# Patient Record
Sex: MALE | Race: WHITE | URBAN - METROPOLITAN AREA
[De-identification: names, ages, dates, MRNs, and addresses within clinical notes are randomized per-mention and may not be internally consistent; named-entity substitution may affect disease eponyms.]

---

## 2022-12-29 ENCOUNTER — OFFICE VISIT (OUTPATIENT)
Dept: URGENT CARE | Age: 42
End: 2022-12-29

## 2022-12-29 VITALS
BODY MASS INDEX: 34.5 KG/M2 | RESPIRATION RATE: 16 BRPM | DIASTOLIC BLOOD PRESSURE: 76 MMHG | SYSTOLIC BLOOD PRESSURE: 118 MMHG | HEIGHT: 71 IN | WEIGHT: 246.4 LBS | HEART RATE: 92 BPM | TEMPERATURE: 99 F | OXYGEN SATURATION: 95 %

## 2022-12-29 DIAGNOSIS — J10.1 INFLUENZA A: Primary | ICD-10-CM

## 2022-12-29 LAB
INFLUENZA VIRUS A RNA: POSITIVE
INFLUENZA VIRUS B RNA: NEGATIVE
KIT LOT NO., HCLOLOT: NORMAL
SARS-COV-2, POC: NORMAL
VALID INTERNAL CONTROL, POC: NORMAL
VENDOR AND KIT NAME POC: NORMAL

## 2022-12-29 RX ORDER — DEXTROMETHORPHAN HYDROBROMIDE AND PROMETHAZINE HYDROCHLORIDE 15; 6.25 MG/5ML; MG/5ML
5 SYRUP ORAL
Qty: 25 ML | Refills: 0 | Status: SHIPPED | OUTPATIENT
Start: 2022-12-29 | End: 2023-01-03

## 2022-12-29 RX ORDER — OSELTAMIVIR PHOSPHATE 75 MG/1
75 CAPSULE ORAL 2 TIMES DAILY
Qty: 10 CAPSULE | Refills: 0 | Status: SHIPPED | OUTPATIENT
Start: 2022-12-29 | End: 2023-01-03

## 2022-12-29 RX ORDER — GUAIFENESIN AND DEXTROMETHORPHAN HYDROBROMIDE 600; 30 MG/1; MG/1
1 TABLET, EXTENDED RELEASE ORAL EVERY 12 HOURS
Qty: 28 TABLET | Refills: 0 | COMMUNITY
Start: 2022-12-29

## 2022-12-29 ASSESSMENT — ENCOUNTER SYMPTOMS
SINUS PAIN: 1
STRIDOR: 0
EYE DISCHARGE: 0
SINUS PRESSURE: 1
SORE THROAT: 1
GASTROINTESTINAL NEGATIVE: 1
EYE ITCHING: 0
COUGH: 1
SHORTNESS OF BREATH: 0
EYE PAIN: 0
WHEEZING: 0

## 2022-12-29 NOTE — PROGRESS NOTES
Viviana Boggs (:  1980) is a 40 y.o. male,New patient, here for evaluation of the following chief complaint(s):  Fever, Congestion, Generalized Body Aches, Cough, Pharyngitis, and Otalgia (X 1 day )      ASSESSMENT/PLAN:    ICD-10-CM    1. Influenza A  J10.1 oseltamivir (TAMIFLU) 75 MG capsule     Dextromethorphan-guaiFENesin (MUCINEX DM)  MG TB12     promethazine-dextromethorphan (PROMETHAZINE-DM) 6.25-15 MG/5ML syrup     POCT Influenza A/B DNA (Alere i)     POC COVID-19      POC COVID test negative  Results for POC orders placed in visit on 22   POCT Influenza A/B DNA (Alere i)   Result Value Ref Range    Influenza virus A RNA Positive     Influenza virus B RNA Negative        Reviewed AVS with patient. All questions answered    Return if symptoms worsen or fail to improve. SUBJECTIVE/OBJECTIVE:  40year old male presents with c/o cough, sore throat, sinus congestion and ear pressure with fever for 24 hours. He reports temp 101.0. He denies known sick exposure. He has treated with tylenol and ibuprofen last taken 4 hours ago with effectiveness. History provided by:  Patient   used: No      Vitals:    22 0853   BP: 118/76   Site: Right Upper Arm   Position: Sitting   Pulse: 92   Resp: 16   Temp: 99 °F (37.2 °C)   TempSrc: Oral   SpO2: 95%   Weight: 246 lb 6.4 oz (111.8 kg)   Height: 5' 11\" (1.803 m)       Review of Systems   Constitutional:  Positive for appetite change, chills, fatigue and fever. HENT:  Positive for congestion, ear pain, sinus pressure, sinus pain and sore throat. Eyes:  Negative for pain, discharge and itching. Respiratory:  Positive for cough. Negative for shortness of breath, wheezing and stridor. Productive cough mild yellow    Cardiovascular: Negative. Gastrointestinal: Negative. Neurological:  Positive for headaches. Physical Exam  Constitutional:       General: He is not in acute distress.      Appearance: Normal appearance. He is obese. He is ill-appearing. HENT:      Right Ear: Tympanic membrane, ear canal and external ear normal.      Left Ear: Tympanic membrane, ear canal and external ear normal.      Nose: Congestion present. No rhinorrhea. Mouth/Throat:      Mouth: Mucous membranes are moist.      Pharynx: No oropharyngeal exudate or posterior oropharyngeal erythema. Cardiovascular:      Rate and Rhythm: Normal rate and regular rhythm. Pulses: Normal pulses. Heart sounds: Normal heart sounds. No murmur heard. Pulmonary:      Effort: Pulmonary effort is normal. No respiratory distress. Breath sounds: Normal breath sounds. No wheezing, rhonchi or rales. Comments: No cough on exam   Skin:     General: Skin is warm and dry. Neurological:      Mental Status: He is alert and oriented to person, place, and time. Psychiatric:         Behavior: Behavior normal.         An electronic signature was used to authenticate this note.     --CARLITOS Arnold - CNP